# Patient Record
Sex: MALE | Race: WHITE
[De-identification: names, ages, dates, MRNs, and addresses within clinical notes are randomized per-mention and may not be internally consistent; named-entity substitution may affect disease eponyms.]

---

## 2019-01-12 ENCOUNTER — HOSPITAL ENCOUNTER (EMERGENCY)
Dept: HOSPITAL 50 - VM.ED | Age: 19
Discharge: HOME | End: 2019-01-12
Payer: COMMERCIAL

## 2019-01-12 DIAGNOSIS — B02.9: Primary | ICD-10-CM

## 2019-01-12 NOTE — EDM.PDOC
ED HPI GENERAL MEDICAL PROBLEM





- General


Chief Complaint: Skin Complaint


Stated Complaint: Possible Shingles


Time Seen by Provider: 01/12/19 21:52


Source of Information: Reports: Patient, RN, RN Notes Reviewed


History Limitations: Reports: No Limitations





- History of Present Illness


INITIAL COMMENTS - FREE TEXT/NARRATIVE: 


Patient presents to the ED at [facility] for the evaluation of a skin rash. 

The rash started about 4 days ago. Patient states it started as a red spot on 

the left lateral chest wall. It has not spread to the left mid back and 

anterior left chest wall. The rash is slightly itchy and moderately painful. 

The rash is vesicular with blister clusters. The patient states he developed a 

headache earlier today. He also has some photophobia. Otherwise, no other 

symptoms.


Onset Date: 01/08/19





- Related Data


 Allergies











Allergy/AdvReac Type Severity Reaction Status Date / Time


 


No Known Allergies Allergy   Verified 01/12/19 22:01











Home Meds: 


 Home Meds





valACYclovir HCl [valACYclovir] 1,000 mg PO TID #16 tablet 01/12/19 [Rx]


valACYclovir HCl [valACYclovir] 1,000 mg PO TID #5 tablet 01/12/19 [Rx]











ED ROS GENERAL





- Review of Systems


Review Of Systems: See Below


Constitutional: Reports: Fever.  Denies: Chills, Weakness


Respiratory: Denies: Shortness of Breath, Cough


Cardiovascular: Denies: Chest Pain, Palpitations


Skin: Reports: Rash


Neurological: Reports: Headache





ED EXAM, SKIN/RASH


Exam: See Below


Exam Limited By: No Limitations


General Appearance: Alert, No Apparent Distress


Respiratory/Chest: No Respiratory Distress, Lungs Clear, Normal Breath Sounds


Cardiovascular: Normal Peripheral Pulses, Regular Rate, Rhythm


Peripheral Pulses: 2+: Radial (L), Radial (R)


Neurological: Alert, Oriented, Normal Cognition


Skin: Erythema, Zoster-Like Rash


Location, Skin: Chest, Back


Characteristics: Vesicular, Erythematous


Associated features: Warmth, Tenderness, Crusting





Departure





- Departure


Time of Disposition: 22:07


Disposition: Home, Self-Care 01


Condition: Good


Clinical Impression: 


Shingles rash


Qualifiers:


 Herpes zoster complications: without complications Qualified Code(s): B02.9 - 

Zoster without complications








- Discharge Information


*PRESCRIPTION DRUG MONITORING PROGRAM REVIEWED*: Not Applicable


*COPY OF PRESCRIPTION DRUG MONITORING REPORT IN PATIENT RAMIN: Not Applicable


Prescriptions: 


valACYclovir HCl [valACYclovir] 1,000 mg PO TID #5 tablet


valACYclovir HCl [valACYclovir] 1,000 mg PO TID #16 tablet


Instructions:  Shingles


Referrals: 


Alton Joseph MD [ED Physician] - 


Forms:  ED Department Discharge


Additional Instructions: 


1. Stay well hydrated and rest


2. Take antiviral medication for the full coarse, even if symptoms are better


3. Do not use any lotions, creams, or gels to the rash, this will make it worse


4. Use Tylenol/Advil for pain


5. Avoid people who are immunosuppressed, elderly, and children until rash has 

completely healed


6. See your PCP as symptoms warrant





- Problem List Review


Problem List Initiated/Reviewed/Updated: Yes





- Assessment/Plan


Assessment:: 


Shingles


Plan: 


Assessment findings discussed with patient. Will start a 7 day course of 

Valtrex TID. Discussed patient to avoid contact with immunosuppressed, elderly, 

children and those who have not had chicken pox. Do not use any lotions, creams

, etc. F/U with PCP as symptoms warrant